# Patient Record
Sex: FEMALE | Race: WHITE | NOT HISPANIC OR LATINO | Employment: OTHER | URBAN - METROPOLITAN AREA
[De-identification: names, ages, dates, MRNs, and addresses within clinical notes are randomized per-mention and may not be internally consistent; named-entity substitution may affect disease eponyms.]

---

## 2017-06-09 ENCOUNTER — PREPPED CHART (OUTPATIENT)
Dept: URBAN - METROPOLITAN AREA CLINIC 39 | Facility: CLINIC | Age: 77
End: 2017-06-09

## 2018-05-04 ENCOUNTER — ESTABLISHED COMPREHENSIVE EXAM (OUTPATIENT)
Dept: URBAN - METROPOLITAN AREA CLINIC 39 | Facility: CLINIC | Age: 78
End: 2018-05-04

## 2018-05-04 VITALS — HEIGHT: 55 IN | HEART RATE: 79 BPM | SYSTOLIC BLOOD PRESSURE: 169 MMHG | DIASTOLIC BLOOD PRESSURE: 95 MMHG

## 2018-05-04 DIAGNOSIS — H04.123: ICD-10-CM

## 2018-05-04 DIAGNOSIS — Z96.1: ICD-10-CM

## 2018-05-04 PROCEDURE — 92014 COMPRE OPH EXAM EST PT 1/>: CPT

## 2018-05-04 PROCEDURE — G8427 DOCREV CUR MEDS BY ELIG CLIN: HCPCS

## 2018-05-04 PROCEDURE — 92015 DETERMINE REFRACTIVE STATE: CPT

## 2018-05-04 PROCEDURE — 1036F TOBACCO NON-USER: CPT

## 2018-05-04 ASSESSMENT — VISUAL ACUITY
OD_SC: J12
OS_CC: 20/25
OS_CC: J1
OD_CC: 20/50-2
OS_SC: J8
OD_SC: 20/400
OD_CC: J1
OS_SC: 20/25+1

## 2018-05-04 ASSESSMENT — TONOMETRY
OD_IOP_MMHG: 14
OS_IOP_MMHG: 17

## 2019-05-06 ENCOUNTER — NEW PATIENT COMPREHENSIVE (OUTPATIENT)
Dept: URBAN - METROPOLITAN AREA CLINIC 35 | Facility: CLINIC | Age: 79
End: 2019-05-06

## 2019-05-06 DIAGNOSIS — Z96.1: ICD-10-CM

## 2019-05-06 DIAGNOSIS — H04.123: ICD-10-CM

## 2019-05-06 PROCEDURE — 92015 DETERMINE REFRACTIVE STATE: CPT

## 2019-05-06 PROCEDURE — 92004 COMPRE OPH EXAM NEW PT 1/>: CPT

## 2019-05-06 ASSESSMENT — VISUAL ACUITY
OD_CC: J1
OD_CC: 20/50
OS_CC: J1
OS_CC: 20/20

## 2019-05-06 ASSESSMENT — TONOMETRY
OS_IOP_MMHG: 15
OD_IOP_MMHG: 18

## 2020-05-11 ENCOUNTER — PREPPED CHART (OUTPATIENT)
Dept: URBAN - METROPOLITAN AREA CLINIC 35 | Facility: CLINIC | Age: 80
End: 2020-05-11

## 2021-03-17 ENCOUNTER — ESTABLISHED PATIENT (OUTPATIENT)
Dept: URBAN - METROPOLITAN AREA CLINIC 35 | Facility: CLINIC | Age: 81
End: 2021-03-17

## 2021-03-17 DIAGNOSIS — H04.123: ICD-10-CM

## 2021-03-17 PROCEDURE — 92015 DETERMINE REFRACTIVE STATE: CPT

## 2021-03-17 PROCEDURE — 92014 COMPRE OPH EXAM EST PT 1/>: CPT

## 2021-03-17 ASSESSMENT — TONOMETRY
OS_IOP_MMHG: 14
OD_IOP_MMHG: 12

## 2021-03-17 ASSESSMENT — VISUAL ACUITY
OS_CC: J3
OD_CC: 20/40
OS_CC: 20/30
OD_CC: J3

## 2023-06-05 ENCOUNTER — ESTABLISHED PATIENT (OUTPATIENT)
Dept: URBAN - METROPOLITAN AREA CLINIC 35 | Facility: CLINIC | Age: 83
End: 2023-06-05

## 2023-06-05 DIAGNOSIS — H04.123: ICD-10-CM

## 2023-06-05 PROCEDURE — 92014 COMPRE OPH EXAM EST PT 1/>: CPT

## 2023-06-05 PROCEDURE — 92015 DETERMINE REFRACTIVE STATE: CPT

## 2023-06-05 ASSESSMENT — VISUAL ACUITY
OS_SC: J10
OD_CC: J1
OD_SC: 20/200
OD_SC: J12
OS_CC: J1
OS_SC: 20/25
OD_CC: 20/30
OS_CC: 20/40

## 2023-06-05 ASSESSMENT — TONOMETRY
OS_IOP_MMHG: 11
OD_IOP_MMHG: 11

## 2023-11-13 NOTE — PATIENT DISCUSSION
Addended by: BRAVO GODFREY on: 11/13/2023 09:01 AM     Modules accepted: Level of Service     Swabbed mucoid discharge and Irrigated with sterile saline in office.